# Patient Record
Sex: MALE | Race: WHITE
[De-identification: names, ages, dates, MRNs, and addresses within clinical notes are randomized per-mention and may not be internally consistent; named-entity substitution may affect disease eponyms.]

---

## 2023-10-05 ENCOUNTER — HOSPITAL ENCOUNTER (OUTPATIENT)
Dept: HOSPITAL 95 - ORSCSDS | Age: 73
Discharge: HOME | End: 2023-10-05
Attending: OPHTHALMOLOGY
Payer: COMMERCIAL

## 2023-10-05 VITALS — SYSTOLIC BLOOD PRESSURE: 129 MMHG | DIASTOLIC BLOOD PRESSURE: 70 MMHG

## 2023-10-05 VITALS — BODY MASS INDEX: 28.1 KG/M2 | HEIGHT: 66 IN | WEIGHT: 174.83 LBS

## 2023-10-05 DIAGNOSIS — Z79.899: ICD-10-CM

## 2023-10-05 DIAGNOSIS — E11.36: Primary | ICD-10-CM

## 2023-10-05 DIAGNOSIS — H25.13: ICD-10-CM

## 2023-10-05 DIAGNOSIS — G47.33: ICD-10-CM

## 2023-10-05 DIAGNOSIS — Z79.82: ICD-10-CM

## 2023-10-05 DIAGNOSIS — I10: ICD-10-CM

## 2023-10-05 PROCEDURE — V2632 POST CHMBR INTRAOCULAR LENS: HCPCS

## 2023-10-05 PROCEDURE — 08RJ3JZ REPLACEMENT OF RIGHT LENS WITH SYNTHETIC SUBSTITUTE, PERCUTANEOUS APPROACH: ICD-10-PCS | Performed by: OPHTHALMOLOGY

## 2023-10-12 ENCOUNTER — HOSPITAL ENCOUNTER (OUTPATIENT)
Dept: HOSPITAL 95 - ORSCSDS | Age: 73
Discharge: HOME | End: 2023-10-12
Attending: OPHTHALMOLOGY
Payer: COMMERCIAL

## 2023-10-12 VITALS — SYSTOLIC BLOOD PRESSURE: 144 MMHG | DIASTOLIC BLOOD PRESSURE: 73 MMHG

## 2023-10-12 VITALS — WEIGHT: 172.62 LBS | BODY MASS INDEX: 27.09 KG/M2 | HEIGHT: 67 IN

## 2023-10-12 DIAGNOSIS — E11.36: Primary | ICD-10-CM

## 2023-10-12 DIAGNOSIS — Z79.899: ICD-10-CM

## 2023-10-12 DIAGNOSIS — Z87.891: ICD-10-CM

## 2023-10-12 DIAGNOSIS — I10: ICD-10-CM

## 2023-10-12 DIAGNOSIS — G47.33: ICD-10-CM

## 2023-10-12 DIAGNOSIS — H25.12: ICD-10-CM

## 2023-10-12 DIAGNOSIS — K21.9: ICD-10-CM

## 2023-10-12 DIAGNOSIS — Z96.1: ICD-10-CM

## 2023-10-12 PROCEDURE — 08RK3JZ REPLACEMENT OF LEFT LENS WITH SYNTHETIC SUBSTITUTE, PERCUTANEOUS APPROACH: ICD-10-PCS | Performed by: OPHTHALMOLOGY

## 2023-10-12 PROCEDURE — V2632 POST CHMBR INTRAOCULAR LENS: HCPCS

## 2023-10-12 NOTE — NUR
10/12/23 1330 Kalee Kaur
TETRACAINE PLACED IN LEFT EYE AT 1306.
PLEDGET PLACED IN LEFT EYE AT 1307.
PATIENT TOLERATED WELL.

## 2024-10-02 ENCOUNTER — HOSPITAL ENCOUNTER (INPATIENT)
Dept: HOSPITAL 95 - ER | Age: 74
LOS: 3 days | Discharge: NURSING, REHAB OR LONG-TERM CARE | DRG: 482 | End: 2024-10-05
Attending: HOSPITALIST | Admitting: STUDENT IN AN ORGANIZED HEALTH CARE EDUCATION/TRAINING PROGRAM
Payer: COMMERCIAL

## 2024-10-02 VITALS — BODY MASS INDEX: 25.22 KG/M2 | HEIGHT: 67 IN | WEIGHT: 160.72 LBS

## 2024-10-02 VITALS — SYSTOLIC BLOOD PRESSURE: 158 MMHG | DIASTOLIC BLOOD PRESSURE: 99 MMHG

## 2024-10-02 VITALS — SYSTOLIC BLOOD PRESSURE: 170 MMHG | DIASTOLIC BLOOD PRESSURE: 96 MMHG

## 2024-10-02 VITALS — DIASTOLIC BLOOD PRESSURE: 88 MMHG | SYSTOLIC BLOOD PRESSURE: 157 MMHG

## 2024-10-02 VITALS — DIASTOLIC BLOOD PRESSURE: 85 MMHG | SYSTOLIC BLOOD PRESSURE: 143 MMHG

## 2024-10-02 VITALS — SYSTOLIC BLOOD PRESSURE: 168 MMHG | DIASTOLIC BLOOD PRESSURE: 85 MMHG

## 2024-10-02 VITALS — SYSTOLIC BLOOD PRESSURE: 163 MMHG | DIASTOLIC BLOOD PRESSURE: 91 MMHG

## 2024-10-02 VITALS — DIASTOLIC BLOOD PRESSURE: 96 MMHG | SYSTOLIC BLOOD PRESSURE: 167 MMHG

## 2024-10-02 VITALS — DIASTOLIC BLOOD PRESSURE: 87 MMHG | SYSTOLIC BLOOD PRESSURE: 132 MMHG

## 2024-10-02 VITALS — SYSTOLIC BLOOD PRESSURE: 165 MMHG | DIASTOLIC BLOOD PRESSURE: 94 MMHG

## 2024-10-02 VITALS — DIASTOLIC BLOOD PRESSURE: 103 MMHG | SYSTOLIC BLOOD PRESSURE: 127 MMHG

## 2024-10-02 VITALS — DIASTOLIC BLOOD PRESSURE: 90 MMHG | SYSTOLIC BLOOD PRESSURE: 175 MMHG

## 2024-10-02 VITALS — DIASTOLIC BLOOD PRESSURE: 91 MMHG | SYSTOLIC BLOOD PRESSURE: 174 MMHG

## 2024-10-02 VITALS — SYSTOLIC BLOOD PRESSURE: 163 MMHG | DIASTOLIC BLOOD PRESSURE: 99 MMHG

## 2024-10-02 VITALS — DIASTOLIC BLOOD PRESSURE: 98 MMHG | SYSTOLIC BLOOD PRESSURE: 171 MMHG

## 2024-10-02 VITALS — SYSTOLIC BLOOD PRESSURE: 132 MMHG | DIASTOLIC BLOOD PRESSURE: 87 MMHG

## 2024-10-02 VITALS — SYSTOLIC BLOOD PRESSURE: 157 MMHG | DIASTOLIC BLOOD PRESSURE: 101 MMHG

## 2024-10-02 VITALS — DIASTOLIC BLOOD PRESSURE: 93 MMHG | SYSTOLIC BLOOD PRESSURE: 148 MMHG

## 2024-10-02 VITALS — SYSTOLIC BLOOD PRESSURE: 165 MMHG | DIASTOLIC BLOOD PRESSURE: 98 MMHG

## 2024-10-02 VITALS — DIASTOLIC BLOOD PRESSURE: 92 MMHG | SYSTOLIC BLOOD PRESSURE: 160 MMHG

## 2024-10-02 DIAGNOSIS — Z79.811: ICD-10-CM

## 2024-10-02 DIAGNOSIS — Z66: ICD-10-CM
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
## 2024-10-02 DIAGNOSIS — M19.90: ICD-10-CM

## 2024-10-02 DIAGNOSIS — Z96.653: ICD-10-CM

## 2024-10-02 DIAGNOSIS — Z87.891: ICD-10-CM

## 2024-10-02 DIAGNOSIS — S72.142A: Primary | ICD-10-CM

## 2024-10-02 DIAGNOSIS — Z96.641: ICD-10-CM

## 2024-10-02 DIAGNOSIS — H91.90: ICD-10-CM

## 2024-10-02 DIAGNOSIS — Z98.890: ICD-10-CM

## 2024-10-02 DIAGNOSIS — N40.0: ICD-10-CM

## 2024-10-02 DIAGNOSIS — Z79.84: ICD-10-CM

## 2024-10-02 DIAGNOSIS — D64.9: ICD-10-CM

## 2024-10-02 DIAGNOSIS — K22.70: ICD-10-CM

## 2024-10-02 DIAGNOSIS — Z79.899: ICD-10-CM

## 2024-10-02 DIAGNOSIS — Z88.8: ICD-10-CM

## 2024-10-02 DIAGNOSIS — E11.9: ICD-10-CM

## 2024-10-02 DIAGNOSIS — I10: ICD-10-CM

## 2024-10-02 DIAGNOSIS — Z98.84: ICD-10-CM

## 2024-10-02 LAB
ALBUMIN SERPL BCP-MCNC: 3.5 G/DL (ref 3.4–5)
ALBUMIN/GLOB SERPL: 1.2 {RATIO} (ref 0.8–1.8)
ALT SERPL W P-5'-P-CCNC: 19 U/L (ref 12–78)
ANION GAP SERPL CALCULATED.4IONS-SCNC: 11 MMOL/L (ref 3–11)
AST SERPL W P-5'-P-CCNC: 14 U/L (ref 12–37)
BASOPHILS # BLD AUTO: 0.03 K/MM3 (ref 0–0.23)
BASOPHILS NFR BLD AUTO: 0 % (ref 0–2)
BILIRUB SERPL-MCNC: 0.4 MG/DL (ref 0.1–1)
BUN SERPL-MCNC: 12 MG/DL (ref 8–24)
CALCIUM SERPL-MCNC: 8.6 MG/DL (ref 8.5–10.1)
CHLORIDE SERPL-SCNC: 107 MMOL/L (ref 98–108)
CO2 SERPL-SCNC: 28 MMOL/L (ref 21–32)
CREAT SERPL-MCNC: 0.77 MG/DL (ref 0.6–1.2)
DEPRECATED RDW RBC AUTO: 42.7 FL (ref 35.1–46.3)
EOSINOPHIL # BLD AUTO: 0.24 K/MM3 (ref 0–0.68)
EOSINOPHIL NFR BLD AUTO: 3 % (ref 0–6)
ERYTHROCYTE [DISTWIDTH] IN BLOOD BY AUTOMATED COUNT: 13.5 % (ref 11.7–14.2)
GLOBULIN SER CALC-MCNC: 2.9 G/DL (ref 2.2–4)
GLUCOSE SERPL-MCNC: 142 MG/DL (ref 70–99)
HCT VFR BLD AUTO: 38.7 % (ref 37–53)
HGB BLD-MCNC: 13 G/DL (ref 13.5–17.5)
IMM GRANULOCYTES # BLD AUTO: 0.04 K/MM3 (ref 0–0.1)
IMM GRANULOCYTES NFR BLD AUTO: 1 % (ref 0–1)
LYMPHOCYTES # BLD AUTO: 0.61 K/MM3 (ref 0.84–5.2)
LYMPHOCYTES NFR BLD AUTO: 8 % (ref 21–46)
MCHC RBC AUTO-ENTMCNC: 33.6 G/DL (ref 31.5–36.5)
MCV RBC AUTO: 87 FL (ref 80–100)
MONOCYTES # BLD AUTO: 0.51 K/MM3 (ref 0.16–1.47)
MONOCYTES NFR BLD AUTO: 7 % (ref 4–13)
NEUTROPHILS # BLD AUTO: 5.86 K/MM3 (ref 1.96–9.15)
NEUTROPHILS NFR BLD AUTO: 80 % (ref 41–73)
NRBC # BLD AUTO: 0 K/MM3 (ref 0–0.02)
NRBC BLD AUTO-RTO: 0 /100 WBC (ref 0–0.2)
PLATELET # BLD AUTO: 194 K/MM3 (ref 150–400)
POTASSIUM SERPL-SCNC: 3.8 MMOL/L (ref 3.5–5.5)
PROT SERPL-MCNC: 6.4 G/DL (ref 6.4–8.2)
PROTHROMBIN TIME: 10.4 SEC (ref 9.7–11.5)
SODIUM SERPL-SCNC: 142 MMOL/L (ref 136–145)

## 2024-10-02 PROCEDURE — U0002 COVID-19 LAB TEST NON-CDC: HCPCS

## 2024-10-02 PROCEDURE — C1713 ANCHOR/SCREW BN/BN,TIS/BN: HCPCS

## 2024-10-02 PROCEDURE — A9270 NON-COVERED ITEM OR SERVICE: HCPCS

## 2024-10-02 PROCEDURE — 0QH734Z INSERTION OF INTERNAL FIXATION DEVICE INTO LEFT UPPER FEMUR, PERCUTANEOUS APPROACH: ICD-10-PCS | Performed by: STUDENT IN AN ORGANIZED HEALTH CARE EDUCATION/TRAINING PROGRAM

## 2024-10-02 PROCEDURE — G0378 HOSPITAL OBSERVATION PER HR: HCPCS

## 2024-10-02 NOTE — NUR
SHIFT SUMMARY NOC.
PT ADMIT FOR LEFT HIP FX. PT MEDICATED FOR PAIN WITH REPORTED RELIEF. PT NPO
SINCE ARRIVAL TO FLOOR ASIDE FROM SMALL SIP OF WATER WITH ORAL PAIN PILLS. PT
VOIDING URINE. CALL LIGHT IN REACH.

## 2024-10-02 NOTE — NUR
ARRIVAL TO SURGICAL UNIT ROOM 215 FROM ER.
PT ARRIVED TO UNIT VIA GURNEY, TOLERATED TRANSFER TO BED WELL. PT CHANGED INTO
GOWN, ORIENTED TO ROOM AND CALL LIGHT. VITAL SIGNS OBTAINED. CALL LIGHT IN
REACH.

## 2024-10-02 NOTE — NUR
SHIFT SUMMARY
POD0 L GAMMA NAIL, A/OX4, VSS, TOLERATING PO, PAIN MANAGED PER EMAR, PT NOT UP
YET POST OP BUT WAS NUMB FROM SPINAL AND NOT ABLE TO HAVE THERAPY TODAY. NO
ACUTE EVENTS THIS SHIFT, CALL LIGHT IN REACH.

## 2024-10-02 NOTE — NUR
PT TO SDS VIA BED FOR LEFT HIP IM NAIL
Patient confirms NPO status and agrees with scheduled surgery.
Pre-Op teaching done. Pt verbalizes understanding.
History, Chart, Medications and Allergies reviewed before start of
procedure.

## 2024-10-02 NOTE — NUR
PACU TO ROOM 215,
PT BROUGHT JOHANNA TO HIS ROOM IN HIS BED, SOMNOLENT BUT ABLE TO WAKE TO VERBAL
STIMULI, 3 DRESSINGS TO L HIP/LATERAL THIGH IN PLACE WITH GAUZE AND CLEAR
TEGADERM, MILD SWELLING NOTED IN R ANTERIOLATERAL THIGH BUT CIRCULATION DISTAL
TO FIXATION SITE REMAIN INTACT, CAP REFIL < 3 SECONDS WITH STRONG PEDAL
PULSES. POST OP VITALS STAARTED. LS CLEAR, CALL LIGHT IN REACH.

## 2024-10-03 VITALS — SYSTOLIC BLOOD PRESSURE: 118 MMHG | DIASTOLIC BLOOD PRESSURE: 68 MMHG

## 2024-10-03 VITALS — DIASTOLIC BLOOD PRESSURE: 59 MMHG | SYSTOLIC BLOOD PRESSURE: 123 MMHG

## 2024-10-03 VITALS — SYSTOLIC BLOOD PRESSURE: 146 MMHG | DIASTOLIC BLOOD PRESSURE: 79 MMHG

## 2024-10-03 VITALS — DIASTOLIC BLOOD PRESSURE: 74 MMHG | SYSTOLIC BLOOD PRESSURE: 136 MMHG

## 2024-10-03 NOTE — NUR
SHIFT SUMMARY NOC.
PT POD 1 FOR LEFT GAMMA NAILING. GAUZE WITH TEGADERM X3 C/D/I. PT VOIDING
URINE, TOLERATING PO. PT MEDICATED FOR PAIN WITH ORALS, REPORTED RELIEF OF SX.
BED IN LOWEST POSITION. CALL LIGHT IN REACH.

## 2024-10-03 NOTE — NUR
SHIFT SUMMARY
PT HAS WORKED w/ BOTH PT & OT TODAY. REPORTS PREFERING RECLINER CHAIR TO
HOSPITAL BED. HAS DECLINED PAIN MEDICINE THIS AFTERNOON STATES HIS HIP FEELS
GOOD. EATING, DRINKING, & VOIDING. SURGICAL SITES WNL.

## 2024-10-04 VITALS — DIASTOLIC BLOOD PRESSURE: 66 MMHG | SYSTOLIC BLOOD PRESSURE: 133 MMHG

## 2024-10-04 VITALS — SYSTOLIC BLOOD PRESSURE: 140 MMHG | DIASTOLIC BLOOD PRESSURE: 68 MMHG

## 2024-10-04 VITALS — SYSTOLIC BLOOD PRESSURE: 115 MMHG | DIASTOLIC BLOOD PRESSURE: 54 MMHG

## 2024-10-04 VITALS — SYSTOLIC BLOOD PRESSURE: 123 MMHG | DIASTOLIC BLOOD PRESSURE: 55 MMHG

## 2024-10-04 LAB — SARS-COV-2 RNA RESP QL NAA+PROBE: NEGATIVE

## 2024-10-04 NOTE — NUR
SHIFT SUMMARY. SHIFT HAS BEEN UNREMARKABLE. PT AOX4, PLEASANT, COOPERATIVE
WITH CARE, CALLS APPROPRIATELY, ABLE TO MAKE NEEDS KNOWN. HAS BEEN ABLE TO
REST COMFORTABLY THROUGHOUT MOST OF SHIFT. REMAINS STEADY 1-2PA TRANSFER FROM
CHAIR TO BED. PAIN ADEQUATELY MANAGED VIA EMAR. VITALS STABLE. LEFT HIP
DRESSING REMAINS C/D/I. URINE OUTPUT HAS BEEN CONSISTENT, CHARTED
APPROPRIATELY. ALL MEDICATIONS ADMINISTERED ON SCHEDULE WITHOUT DIFFICULTY.
BED LOCKED IN LOWEST POSITION. CALL LIGHT LEFT WITHIN REACH.

## 2024-10-05 VITALS — SYSTOLIC BLOOD PRESSURE: 133 MMHG | DIASTOLIC BLOOD PRESSURE: 62 MMHG

## 2024-10-05 VITALS — DIASTOLIC BLOOD PRESSURE: 58 MMHG | SYSTOLIC BLOOD PRESSURE: 128 MMHG

## 2024-10-05 LAB
ANION GAP SERPL CALCULATED.4IONS-SCNC: 9 MMOL/L (ref 3–11)
BUN SERPL-MCNC: 20 MG/DL (ref 8–24)
CALCIUM SERPL-MCNC: 8.7 MG/DL (ref 8.5–10.1)
CHLORIDE SERPL-SCNC: 105 MMOL/L (ref 98–108)
CO2 SERPL-SCNC: 29 MMOL/L (ref 21–32)
CREAT SERPL-MCNC: 0.74 MG/DL (ref 0.6–1.2)
DEPRECATED RDW RBC AUTO: 44 FL (ref 35.1–46.3)
ERYTHROCYTE [DISTWIDTH] IN BLOOD BY AUTOMATED COUNT: 13.9 % (ref 11.7–14.2)
GLUCOSE SERPL-MCNC: 207 MG/DL (ref 70–99)
HCT VFR BLD AUTO: 23.1 % (ref 37–53)
HGB BLD-MCNC: 7.8 G/DL (ref 13.5–17.5)
MCHC RBC AUTO-ENTMCNC: 33.8 G/DL (ref 31.5–36.5)
MCV RBC AUTO: 87 FL (ref 80–100)
NRBC # BLD AUTO: 0 K/MM3 (ref 0–0.02)
NRBC BLD AUTO-RTO: 0 /100 WBC (ref 0–0.2)
PLATELET # BLD AUTO: 184 K/MM3 (ref 150–400)
POTASSIUM SERPL-SCNC: 4 MMOL/L (ref 3.5–5.5)
SODIUM SERPL-SCNC: 139 MMOL/L (ref 136–145)

## 2024-10-05 NOTE — NUR
DISCHARGE SUMMARY
POD3 L PERC HIP NAILING, A/OX4, VSS, TOLERATING PO, PAIN WELL MANAGED,
DRESSINGS TO 3 SURGICAL SITES CHANGED TODAY AND CLEANED AT THE BEDSIDE, NEW
DRESSINGS APPLIED ARE AQUACELLS. IV ACCESS REMOVED, REPORT CALLED TO 
NURSING STAFF.

## 2024-10-05 NOTE — NUR
SHIFT SUMMARY
POD 3 L HIP NAILING
PT RESTED T/O NIGHT. PAIN MANAGED PER EMAR. TOLERATING PO INTAKE, VOIDING.
DRESSING TO L HIP IS C/D/I. PT TRANSFERS WITH 1-2 P ASST FWW AND GB. VSS. PLAN
TO D/C TO SNF TODAY. NO OTHER COCERNS AT THIS TIME, CALL LIGHT WITHIN REACH